# Patient Record
Sex: MALE | Race: WHITE | Employment: FULL TIME | URBAN - METROPOLITAN AREA
[De-identification: names, ages, dates, MRNs, and addresses within clinical notes are randomized per-mention and may not be internally consistent; named-entity substitution may affect disease eponyms.]

---

## 2017-11-07 ENCOUNTER — APPOINTMENT (OUTPATIENT)
Dept: GENERAL RADIOLOGY | Age: 37
End: 2017-11-07
Attending: PHYSICIAN ASSISTANT
Payer: SELF-PAY

## 2017-11-07 ENCOUNTER — HOSPITAL ENCOUNTER (EMERGENCY)
Age: 37
Discharge: HOME | End: 2017-11-07
Attending: EMERGENCY MEDICINE
Payer: SELF-PAY

## 2017-11-07 VITALS
OXYGEN SATURATION: 100 % | SYSTOLIC BLOOD PRESSURE: 135 MMHG | TEMPERATURE: 97.6 F | HEART RATE: 81 BPM | WEIGHT: 235 LBS | HEIGHT: 74 IN | DIASTOLIC BLOOD PRESSURE: 73 MMHG | RESPIRATION RATE: 17 BRPM | BODY MASS INDEX: 30.16 KG/M2

## 2017-11-07 DIAGNOSIS — R07.9 CHEST PAIN, UNSPECIFIED TYPE: Primary | ICD-10-CM

## 2017-11-07 LAB
BNP SERPL-MCNC: 25 PG/ML (ref 0–450)
CK MB CFR SERPL CALC: ABNORMAL % (ref 0–4)
CK MB SERPL-MCNC: <1 NG/ML (ref 5–25)
CK SERPL-CCNC: 384 U/L (ref 39–308)
D DIMER PPP FEU-MCNC: <0.27 UG/ML(FEU)
TROPONIN I SERPL-MCNC: <0.02 NG/ML (ref 0–0.06)

## 2017-11-07 PROCEDURE — 83880 ASSAY OF NATRIURETIC PEPTIDE: CPT

## 2017-11-07 PROCEDURE — 85379 FIBRIN DEGRADATION QUANT: CPT

## 2017-11-07 PROCEDURE — 74011250636 HC RX REV CODE- 250/636: Performed by: PHYSICIAN ASSISTANT

## 2017-11-07 PROCEDURE — 82550 ASSAY OF CK (CPK): CPT

## 2017-11-07 PROCEDURE — 93005 ELECTROCARDIOGRAM TRACING: CPT

## 2017-11-07 PROCEDURE — 96374 THER/PROPH/DIAG INJ IV PUSH: CPT

## 2017-11-07 PROCEDURE — 71010 XR CHEST PORT: CPT

## 2017-11-07 PROCEDURE — 99283 EMERGENCY DEPT VISIT LOW MDM: CPT

## 2017-11-07 RX ORDER — KETOROLAC TROMETHAMINE 30 MG/ML
30 INJECTION, SOLUTION INTRAMUSCULAR; INTRAVENOUS
Status: COMPLETED | OUTPATIENT
Start: 2017-11-07 | End: 2017-11-07

## 2017-11-07 RX ADMIN — KETOROLAC TROMETHAMINE 30 MG: 30 INJECTION, SOLUTION INTRAMUSCULAR at 18:12

## 2017-11-07 NOTE — ED NOTES
I have reviewed discharge instructions with the patient. The patient verbalized understanding. Patient armband removed and given to patient to take home. Patient was informed of the privacy risks if armband lost or stolen  There are no discharge medications for this patient.

## 2017-11-07 NOTE — DISCHARGE INSTRUCTIONS
Chest Pain: Care Instructions  Your Care Instructions    There are many things that can cause chest pain. Some are not serious and will get better on their own in a few days. But some kinds of chest pain need more testing and treatment. Your doctor may have recommended a follow-up visit in the next 8 to 12 hours. If you are not getting better, you may need more tests or treatment. Even though your doctor has released you, you still need to watch for any problems. The doctor carefully checked you, but sometimes problems can develop later. If you have new symptoms or if your symptoms do not get better, get medical care right away. If you have worse or different chest pain or pressure that lasts more than 5 minutes or you passed out (lost consciousness), call 911 or seek other emergency help right away. A medical visit is only one step in your treatment. Even if you feel better, you still need to do what your doctor recommends, such as going to all suggested follow-up appointments and taking medicines exactly as directed. This will help you recover and help prevent future problems. How can you care for yourself at home? · Rest until you feel better. · Take your medicine exactly as prescribed. Call your doctor if you think you are having a problem with your medicine. · Do not drive after taking a prescription pain medicine. When should you call for help? Call 911 if:  ? · You passed out (lost consciousness). ? · You have severe difficulty breathing. ? · You have symptoms of a heart attack. These may include:  ¨ Chest pain or pressure, or a strange feeling in your chest.  ¨ Sweating. ¨ Shortness of breath. ¨ Nausea or vomiting. ¨ Pain, pressure, or a strange feeling in your back, neck, jaw, or upper belly or in one or both shoulders or arms. ¨ Lightheadedness or sudden weakness. ¨ A fast or irregular heartbeat.   After you call 911, the  may tell you to chew 1 adult-strength or 2 to 4 low-dose aspirin. Wait for an ambulance. Do not try to drive yourself. ?Call your doctor today if:  ? · You have any trouble breathing. ? · Your chest pain gets worse. ? · You are dizzy or lightheaded, or you feel like you may faint. ? · You are not getting better as expected. ? · You are having new or different chest pain. Where can you learn more? Go to http://tuan-richie.info/. Enter A120 in the search box to learn more about \"Chest Pain: Care Instructions. \"  Current as of: 2017  Content Version: 11.4  © 4187-2273 [employer]. Care instructions adapted under license by [employer] (which disclaims liability or warranty for this information). If you have questions about a medical condition or this instruction, always ask your healthcare professional. [address] any warranty or liability for your use of this information. ConnectEdu Activation    Thank you for requesting access to ConnectEdu. Please follow the instructions below to securely access and download your online medical record. ConnectEdu allows you to send messages to your doctor, view your test results, renew your prescriptions, schedule appointments, and more. How Do I Sign Up? 1. In your internet browser, go to [email or web address]  2. Click on the First Time User? Click Here link in the Sign In box. You will be redirect to the New Member Sign Up page. 3. Enter your ConnectEdu Access Code exactly as it appears below. You will not need to use this code after youve completed the sign-up process. If you do not sign up before the expiration date, you must request a new code. ConnectEdu Access Code: [email or web address]  Expires: 2018  6:11 PM (This is the date your ConnectEdu access code will )    4. Enter the last four digits of your Social Security Number (xxxx) and Date of Birth (mm/dd/yyyy) as indicated and click Submit.  You will be taken to the next sign-up page.  5. Create a On [employer] Bill[employer] ID. This will be your [employer] login ID and cannot be changed, so think of one that is secure and easy to remember. 6. Create a [employer] password. You can change your password at any time. 7. Enter your Password Reset Question and Answer. This can be used at a later time if you forget your password. 8. Enter your e-mail address. You will receive e-mail notification when new information is available in [address]. 9. Click Sign Up. You can now view and download portions of your medical record. 10. Click the Download Summary menu link to download a portable copy of your medical information. Additional Information    If you have questions, please visit the Frequently Asked Questions section of the [employer] website at [email or web address] Remember, [employer] is NOT to be used for urgent needs. For medical emergencies, dial 911.

## 2017-11-07 NOTE — LETTER
[address] [place] [address] Dr [facility] EMERGENC[facility] DEPT 
[address] [facility] [address] [phone] Work/School Note Date: 11/7/2017 To Whom It May concern: 
 
Devon Malin was seen and treated today in the emergency room by the following provider(s): 
Attending Provider: Boubacar Smallwood MD 
Physician Assistant: EMEKA Ferro. Devon Malin may return to work on Nov 9 th, 2017. Sincerely, EMEKA Ferro

## 2017-11-07 NOTE — ED PROVIDER NOTES
Patient is a 44 y.o. male presenting with chest pain. Chest Pain (Angina)       Alba Soto is a 44 y.o. male resident of [place] recently moved to [place] presents with c/o L sided chest pain started at rest 2 days ago and described as sharp constant, waxing and waning and currently 7/10 but denies of any SOB, fever, cough,chills, n/v, diaphoresis or palpitation. No hx of lower ext swelling orthopnea, PND or diff breathing or known hx of blood clot. Past medical hx included VSD post repair when he was 7 months old. Had cardiologist in [place] and does not have physician here. Has has similar pain in the same site multiple times in the past and all work up has been negative. Last episode was 1 yrs ago \"and every time I am under stress I get these chest pain and now I am under some family related stress\"   Has a > 20 pkk/yr smoking hx. Social History     Social History    Marital status: SINGLE     Spouse name: N/A    Number of children: N/A    Years of education: N/A     Occupational History    Not on file. Social History Main Topics    Smoking status: Not on file    Smokeless tobacco: Not on file    Alcohol use Not on file    Drug use: Not on file    Sexual activity: Not on file     Other Topics Concern    Not on file     Social History Narrative         ALLERGIES: Review of patient's allergies indicates no known allergies. Review of Systems   Constitutional: Negative. HENT: Negative. Eyes: Negative. Respiratory: Negative. Cardiovascular: Positive for chest pain. Gastrointestinal: Negative. Endocrine: Negative. Genitourinary: Negative. Musculoskeletal: Negative. Skin: Negative. Allergic/Immunologic: Negative. Neurological: Negative. Hematological: Negative. Psychiatric/Behavioral: Negative.         Vitals:    11/07/17 1611 11/07/17 1700   BP: 153/86 153/86   Pulse: 75 74   Resp: 20 22   Temp: 97.6 °F (36.4 °C)    SpO2: 99% 96%   Weight: 106.6 kg (235 lb)    Height: 6' 2\" (1.88 m)             Physical Exam   Constitutional: He is oriented to person, place, and time. He appears well-developed and well-nourished. No distress. HENT:   Head: Normocephalic and atraumatic. Eyes: Conjunctivae and EOM are normal. Pupils are equal, round, and reactive to light. Neck: Normal range of motion. Cardiovascular: Normal rate, regular rhythm and normal heart sounds. Pulmonary/Chest: Effort normal and breath sounds normal. No respiratory distress. He has no wheezes. He has no rales. He exhibits tenderness. Abdominal: Soft. Bowel sounds are normal. He exhibits no distension. There is no tenderness. There is no rebound and no guarding. Musculoskeletal: Normal range of motion. Neurological: He is alert and oriented to person, place, and time. Skin: Skin is warm. He is not diaphoretic. Psychiatric: He has a normal mood and affect. His behavior is normal. Judgment and thought content normal.        MDM  Number of Diagnoses or Management Options  Chest pain, unspecified type:   Diagnosis management comments: With hx of VSD and tobacco use will get cardiac enzymes including D Dimer If negative will discharge recommend follow up with cardiologist as OP. Patient updated on labs and recommended on smoking cessation and to get cardiology care as well. All question answered.  Will discharge    ED Course       Procedures    Recent Results (from the past 12 hour(s))   EKG, 12 LEAD, INITIAL    Collection Time: 11/07/17  4:09 PM   Result Value Ref Range    Ventricular Rate 85 BPM    Atrial Rate 85 BPM    P-R Interval 146 ms    QRS Duration 86 ms    Q-T Interval 378 ms    QTC Calculation (Bezet) 449 ms    Calculated P Axis 45 degrees    Calculated R Axis 40 degrees    Calculated T Axis 47 degrees    Diagnosis       Normal sinus rhythm  Normal ECG  No previous ECGs available     CARDIAC PANEL,(CK, CKMB & TROPONIN)    Collection Time: 11/07/17  4:40 PM Result Value Ref Range     (H) 39 - 308 U/L    CK - MB <1.0 <3.6 ng/ml    CK-MB Index  0.0 - 4.0 %     CALCULATION NOT PERFORMED WHEN RESULT IS BELOW LINEAR LIMIT    Troponin-I, Qt. <0.02 0.00 - 0.06 NG/ML   NT-PRO BNP    Collection Time: 11/07/17  4:40 PM   Result Value Ref Range    NT pro-BNP 25 0 - 450 PG/ML   D DIMER    Collection Time: 11/07/17  4:40 PM   Result Value Ref Range    D DIMER <0.27 <0.46 ug/ml(FEU)         DISCHARGE NOTE:  6:12 PM    Juan Stephenson  results have been reviewed with him. He has been counseled regarding his diagnosis, treatment, and plan. He verbally conveys understanding and agreement of the signs, symptoms, diagnosis, treatment and prognosis and additionally agrees to follow up as discussed. He also agrees with the care-plan and conveys that all of his questions have been answered. I have also provided discharge instructions for him that include: educational information regarding their diagnosis and treatment, and list of reasons why they would want to return to the ED prior to their follow-up appointment, should his condition change. CLINICAL IMPRESSION:    1. Chest pain, unspecified type        AFTER VISIT PLAN:    There are no discharge medications for this patient.        Follow-up Information     Follow up With Details Comments Jes Fay  To establish care with primary care provider [address] [address] [phone]    [facility] EMERGENCY DEPT  If symptoms worsen [address] Celina Hernandez MD Schedule an appointment as soon as possible for a visit cardiology [facility]  [facility]  [place] [address]  [phone]             Written by Deepak Santiago PA-C

## 2017-11-07 NOTE — LETTER
[address] [place] [address] [facility] EMERG[facility] 
[address] [facility] [address] 
[phone] Work/School Note Date: 11/7/2017 To Whom It May concern: 
 
Kaylynn Wilkerson was seen and treated today in the emergency room by the following provider(s): 
Attending Provider: Vero Dubose MD 
Physician Assistant: EMEKA Sanchez. Kaylynn Wilkerson may return to work on Nov 8 th, 2017. Stephan Randolph Sincerely, EMEKA Sanchez

## 2017-11-08 LAB
ATRIAL RATE: 85 BPM
CALCULATED P AXIS, ECG09: 45 DEGREES
CALCULATED R AXIS, ECG10: 40 DEGREES
CALCULATED T AXIS, ECG11: 47 DEGREES
DIAGNOSIS, 93000: NORMAL
P-R INTERVAL, ECG05: 146 MS
Q-T INTERVAL, ECG07: 378 MS
QRS DURATION, ECG06: 86 MS
QTC CALCULATION (BEZET), ECG08: 449 MS
VENTRICULAR RATE, ECG03: 85 BPM

## 2018-11-07 ENCOUNTER — HOSPITAL ENCOUNTER (EMERGENCY)
Age: 38
Discharge: HOME | End: 2018-11-08
Attending: EMERGENCY MEDICINE
Payer: SELF-PAY

## 2018-11-07 VITALS
RESPIRATION RATE: 16 BRPM | WEIGHT: 227 LBS | OXYGEN SATURATION: 95 % | HEART RATE: 79 BPM | BODY MASS INDEX: 28.23 KG/M2 | TEMPERATURE: 98.6 F | DIASTOLIC BLOOD PRESSURE: 83 MMHG | SYSTOLIC BLOOD PRESSURE: 139 MMHG | HEIGHT: 75 IN

## 2018-11-07 DIAGNOSIS — H65.02 ACUTE SEROUS OTITIS MEDIA OF LEFT EAR, RECURRENCE NOT SPECIFIED: Primary | ICD-10-CM

## 2018-11-07 PROCEDURE — 99283 EMERGENCY DEPT VISIT LOW MDM: CPT

## 2018-11-08 PROCEDURE — 74011250637 HC RX REV CODE- 250/637: Performed by: EMERGENCY MEDICINE

## 2018-11-08 RX ORDER — AMOXICILLIN 250 MG/1
500 CAPSULE ORAL
Status: COMPLETED | OUTPATIENT
Start: 2018-11-08 | End: 2018-11-08

## 2018-11-08 RX ORDER — AMOXICILLIN 500 MG/1
500 TABLET, FILM COATED ORAL 3 TIMES DAILY
Qty: 30 TAB | Refills: 0 | Status: SHIPPED | OUTPATIENT
Start: 2018-11-08

## 2018-11-08 RX ORDER — IBUPROFEN 600 MG/1
600 TABLET ORAL
Qty: 20 TAB | Refills: 0 | Status: SHIPPED | OUTPATIENT
Start: 2018-11-08

## 2018-11-08 RX ORDER — IBUPROFEN 600 MG/1
600 TABLET ORAL
Status: COMPLETED | OUTPATIENT
Start: 2018-11-08 | End: 2018-11-08

## 2018-11-08 RX ADMIN — IBUPROFEN 600 MG: 600 TABLET ORAL at 00:15

## 2018-11-08 RX ADMIN — AMOXICILLIN 500 MG: 250 CAPSULE ORAL at 00:15

## 2018-11-08 NOTE — DISCHARGE INSTRUCTIONS
Middle Ear Fluid: Care Instructions  Your Care Instructions    Fluid often builds up inside the ear during a cold or allergies. Usually the fluid drains away, but sometimes a small tube in the ear, called the eustachian tube, stays blocked for months. Symptoms of fluid buildup may include:  · Popping, ringing, or a feeling of fullness or pressure in the ear. · Trouble hearing. · Balance problems and dizziness. In most cases, you can treat yourself at home. Follow-up care is a key part of your treatment and safety. Be sure to make and go to all appointments, and call your doctor if you are having problems. It's also a good idea to know your test results and keep a list of the medicines you take. How can you care for yourself at home? · In most cases, the fluid clears up within a few months without treatment. You may need more tests if the fluid does not clear up after 3 months. · If your doctor prescribed antibiotics, take them as directed. Do not stop taking them just because you feel better. You need to take the full course of antibiotics. When should you call for help? Call your doctor now or seek immediate medical care if:    · You have symptoms of infection, such as:  ? Increased pain, swelling, warmth, or redness. ? Pus draining from the area. ? A fever.    Watch closely for changes in your health, and be sure to contact your doctor if:    · You notice changes in hearing.     · You do not get better as expected. Where can you learn more? Go to http://tuan-richie.info/. Enter O771 in the search box to learn more about \"Middle Ear Fluid: Care Instructions. \"  Current as of: March 28, 2018  Content Version: 11.8  © 0466-9484 [employer]. Care instructions adapted under license by [employer] (which disclaims liability or warranty for this information).  If you have questions about a medical condition or this instruction, always ask your healthcare professional. [address] any warranty or liability for your use of this information.

## 2018-11-08 NOTE — ED PROVIDER NOTES
EMERGENCY DEPARTMENT HISTORY AND PHYSICAL EXAM 
 
12:03 AM 
 
 
Date: 11/7/2018 Patient Name: Chelsea Reyes History of Presenting Illness Chief Complaint Patient presents with  Ear Pain History Provided By: Patient Chief Complaint: Ear Pain Duration:  1-2 days Timing:  Acute Location: Left Ear Quality: Aching Severity: Mild Modifying Factors: None. Associated Symptoms: denies any other associated signs or symptoms Additional History (Context): Chelsea Reyes is a 43 y.o. male with No significant past medical history who presents with c/o mild, acute left ear pain onset 1-2 days. Pt states he gets ear infections fairly regularly and that current sx are consistent with previous infections. Denies any fever, chills, CP, SOB, abdominal pain, nausea, vomiting, diarrhea, urinary sx and any associated signs or sx. No further concerns or complaints. PCP: [place], MD Radha 
 
 
 
Past History Past Medical History: 
History reviewed. No pertinent past medical history. Past Surgical History: 
History reviewed. No pertinent surgical history. Family History: 
History reviewed. No pertinent family history. Social History: 
Social History Tobacco Use  Smoking status: Not on file Substance Use Topics  Alcohol use: Not on file  Drug use: Not on file Allergies: 
No Known Allergies Review of Systems Review of Systems Constitutional: Negative for activity change, fatigue and fever. HENT: Positive for ear pain. Negative for congestion and rhinorrhea. Eyes: Negative for visual disturbance. Respiratory: Negative for shortness of breath. Cardiovascular: Negative for chest pain and palpitations. Gastrointestinal: Negative for abdominal pain, diarrhea, nausea and vomiting. Genitourinary: Negative for dysuria and hematuria. Musculoskeletal: Negative for back pain. Skin: Negative for rash. Neurological: Negative for dizziness, weakness and light-headedness. All other systems reviewed and are negative. Physical Exam  
 
Visit Vitals /83 Pulse 79 Temp 98.6 °F (37 °C) Resp 16 Ht 6' 3\" (1.905 m) Wt 103 kg (227 lb) SpO2 95% BMI 28.37 kg/m² Physical Exam  
Constitutional: He is oriented to person, place, and time. He appears well-developed and well-nourished. No distress. HENT:  
Head: Normocephalic. Mouth/Throat: Oropharynx is clear and moist.  
Left ear erythematous, dull TM Eyes: Conjunctivae and EOM are normal. Pupils are equal, round, and reactive to light. Neck: Normal range of motion. Neck supple. Cardiovascular: Normal rate, regular rhythm, normal heart sounds and intact distal pulses. No murmur heard. Pulmonary/Chest: Effort normal and breath sounds normal. No respiratory distress. He has no wheezes. He has no rales. He exhibits no tenderness. Abdominal: Soft. Bowel sounds are normal. He exhibits no distension. There is no tenderness. There is no rebound. Musculoskeletal: Normal range of motion. He exhibits no edema or tenderness. Neurological: He is alert and oriented to person, place, and time. No cranial nerve deficit. He exhibits normal muscle tone. Coordination normal.  
Skin: Skin is warm and dry. No rash noted. Psychiatric: He has a normal mood and affect. His behavior is normal. Judgment and thought content normal.  
Nursing note and vitals reviewed. Diagnostic Study Results Medical Decision Making I am the first provider for this patient. I reviewed the vital signs, available nursing notes, past medical history, past surgical history, family history and social history. Vital Signs-Reviewed the patient's vital signs. Records Reviewed: Nursing Notes (Time of Review: 12:03 AM) ED Course: Progress Notes, Reevaluation, and Consults: 
 
Provider Notes (Medical Decision Making): otitis media, otitis external, URI, pneumonia Diagnosis Clinical Impression: 1. Acute serous otitis media of left ear, recurrence not specified Disposition: Home. Follow-up Information None Medication List  
  
You have not been prescribed any medications. _______________________________ Scribe Attestation Samanta Collado acting as a scribe for and in the presence of Jeannine Suarez MD     
November 08, 2018 at 12:03 AM 
    
Provider Attestation:     
I personally performed the services described in the documentation, reviewed the documentation, as recorded by the scribe in my presence, and it accurately and completely records my words and actions. November 08, 2018 at 12:03 AM - Jeannine Suarez MD   
 
 
_______________________________

## 2018-11-08 NOTE — ED NOTES
Pt discharge home in stable condition with verbal and written instructions about medications and follow up.

## 2018-12-27 ENCOUNTER — HOSPITAL ENCOUNTER (EMERGENCY)
Age: 38
Discharge: HOME | End: 2018-12-27
Attending: EMERGENCY MEDICINE
Payer: SELF-PAY

## 2018-12-27 VITALS
WEIGHT: 230 LBS | HEIGHT: 75 IN | DIASTOLIC BLOOD PRESSURE: 76 MMHG | OXYGEN SATURATION: 97 % | BODY MASS INDEX: 28.6 KG/M2 | HEART RATE: 88 BPM | RESPIRATION RATE: 16 BRPM | TEMPERATURE: 99 F | SYSTOLIC BLOOD PRESSURE: 115 MMHG

## 2018-12-27 DIAGNOSIS — H92.02 LEFT EAR PAIN: Primary | ICD-10-CM

## 2018-12-27 DIAGNOSIS — Z71.1 CONCERN ABOUT STD IN MALE WITHOUT DIAGNOSIS: ICD-10-CM

## 2018-12-27 LAB
APPEARANCE UR: CLEAR
BILIRUB UR QL: NEGATIVE
COLOR UR: YELLOW
EPITH CASTS URNS QL MICRO: ABNORMAL /LPF (ref 0–5)
GLUCOSE UR STRIP.AUTO-MCNC: NEGATIVE MG/DL
HGB UR QL STRIP: NEGATIVE
KETONES UR QL STRIP.AUTO: NEGATIVE MG/DL
LEUKOCYTE ESTERASE UR QL STRIP.AUTO: ABNORMAL
MUCOUS THREADS URNS QL MICRO: ABNORMAL /LPF
NITRITE UR QL STRIP.AUTO: NEGATIVE
PH UR STRIP: 6.5 [PH] (ref 5–8)
PROT UR STRIP-MCNC: NEGATIVE MG/DL
RBC #/AREA URNS HPF: ABNORMAL /HPF (ref 0–5)
SP GR UR REFRACTOMETRY: 1.03 (ref 1–1.03)
UROBILINOGEN UR QL STRIP.AUTO: 1 EU/DL (ref 0.2–1)
WBC URNS QL MICRO: ABNORMAL /HPF (ref 0–4)

## 2018-12-27 PROCEDURE — 99283 EMERGENCY DEPT VISIT LOW MDM: CPT

## 2018-12-27 PROCEDURE — 96372 THER/PROPH/DIAG INJ SC/IM: CPT

## 2018-12-27 PROCEDURE — 74011250637 HC RX REV CODE- 250/637: Performed by: PHYSICIAN ASSISTANT

## 2018-12-27 PROCEDURE — 87491 CHLMYD TRACH DNA AMP PROBE: CPT

## 2018-12-27 PROCEDURE — 81001 URINALYSIS AUTO W/SCOPE: CPT

## 2018-12-27 PROCEDURE — 74011250636 HC RX REV CODE- 250/636: Performed by: PHYSICIAN ASSISTANT

## 2018-12-27 RX ORDER — AZITHROMYCIN 250 MG/1
1000 TABLET, FILM COATED ORAL
Status: COMPLETED | OUTPATIENT
Start: 2018-12-27 | End: 2018-12-27

## 2018-12-27 RX ADMIN — AZITHROMYCIN 1000 MG: 250 TABLET, FILM COATED ORAL at 11:16

## 2018-12-27 RX ADMIN — LIDOCAINE HYDROCHLORIDE 250 MG: 10 INJECTION, SOLUTION EPIDURAL; INFILTRATION; INTRACAUDAL; PERINEURAL at 11:16

## 2018-12-27 NOTE — ED NOTES
I have reviewed discharge instructions with the patient. The patient verbalized understanding. Pt  dc'd via ambulation with belongings. Pt left before receiving  Written instructions.  Pt given verbal instructions

## 2018-12-27 NOTE — ED PROVIDER NOTES
EMERGENCY DEPARTMENT HISTORY AND PHYSICAL EXAM    Date: 12/27/2018  Patient Name: Charan Short    History of Presenting Illness     Chief Complaint   Patient presents with    Ear Pain         History Provided By: Patient    Chief Complaint: ear pain  Duration: 1 week  Timing:  Constant  Location: left ear  Quality: Aching  Severity: 3 out of 10  Modifying Factors: \"I am obsessed about cleaning my ears, I think I did something. \"  Associated Symptoms: ears pop      Additional History (Context): Charan Short is a 43 y.o. male with No significant past medical history who presents with left ear pain x 1 week. Pt states he is \"obsessed\" with cleaning his ears since age of 4 and is concerned he may have \"damaged something. \" States ear has been popping and feeling full. Denies fever, chills, ST, cough or any other associated complaints. Has hx of multiple infections. He is also concerned about possible STD exposure. States his significant other was treated for \"UTI and was negative for the other things but I think I still have something. \" Denies penile discharge, abd pain, dysuria or any other complaints or symptoms. PCP: Radha Rose MD    Current Outpatient Medications   Medication Sig Dispense Refill    ibuprofen (MOTRIN) 600 mg tablet Take 1 Tab by mouth every six (6) hours as needed for Pain. 20 Tab 0    amoxicillin 500 mg tab Take 500 mg by mouth three (3) times daily. 30 Tab 0       Past History     Past Medical History:  No past medical history on file. Past Surgical History:  No past surgical history on file. Family History:  No family history on file. Social History:  Social History     Tobacco Use    Smoking status: Not on file   Substance Use Topics    Alcohol use: Not on file    Drug use: Not on file       Allergies:  No Known Allergies      Review of Systems   Review of Systems   HENT: Positive for ear pain. Negative for ear discharge. Genitourinary: Negative.     All other systems reviewed and are negative. All Other Systems Negative  Physical Exam     Vitals:    12/27/18 1031   BP: 115/76   Pulse: 88   Resp: 16   Temp: 99 °F (37.2 °C)   SpO2: 97%   Weight: 104.3 kg (230 lb)   Height: 6' 3\" (1.905 m)     Physical Exam   Constitutional: He appears well-developed and well-nourished. No distress. HENT:   Head: Normocephalic and atraumatic. Right Ear: Hearing, tympanic membrane, external ear and ear canal normal.   Left Ear: Hearing, external ear and ear canal normal. Tympanic membrane is not perforated and not retracted. Nose: Nose normal.   Mouth/Throat: Oropharynx is clear and moist.   Left TM slightly red and bulging with effusion    Eyes: Conjunctivae are normal. Right eye exhibits no discharge. Left eye exhibits no discharge. Neck: Normal range of motion. Neck supple. Cardiovascular: Normal rate and regular rhythm. Genitourinary:   Genitourinary Comments:  exam deferred due to no symptoms   Lymphadenopathy:     He has no cervical adenopathy. Neurological: He is alert. Skin: Skin is warm and dry. He is not diaphoretic. Psychiatric: He has a normal mood and affect.             Diagnostic Study Results     Labs -     Recent Results (from the past 12 hour(s))   URINALYSIS W/ RFLX MICROSCOPIC    Collection Time: 12/27/18 10:36 AM   Result Value Ref Range    Color YELLOW      Appearance CLEAR      Specific gravity 1.027 1.005 - 1.030      pH (UA) 6.5 5.0 - 8.0      Protein NEGATIVE  NEG mg/dL    Glucose NEGATIVE  NEG mg/dL    Ketone NEGATIVE  NEG mg/dL    Bilirubin NEGATIVE  NEG      Blood NEGATIVE  NEG      Urobilinogen 1.0 0.2 - 1.0 EU/dL    Nitrites NEGATIVE  NEG      Leukocyte Esterase TRACE (A) NEG     URINE MICROSCOPIC ONLY    Collection Time: 12/27/18 10:36 AM   Result Value Ref Range    WBC 0 to 3 0 - 4 /hpf    RBC NONE 0 - 5 /hpf    Epithelial cells FEW 0 - 5 /lpf    Mucus 1+ (A) NEG /lpf       Radiologic Studies -   No orders to display     CT Results  (Last 48 hours)    None        CXR Results  (Last 48 hours)    None            Medical Decision Making   I am the first provider for this patient. I reviewed the vital signs, available nursing notes, past medical history, past surgical history, family history and social history. Vital Signs-Reviewed the patient's vital signs. Pulse Oximetry Analysis - 97% on RA      Records Reviewed: Nursing Notes    Procedures:  Procedures    Provider Notes (Medical Decision Making): pt c/o left ear pain and concern he may have damaged something while cleaning ear. No evidence of infection and pt declines abx, will get otc zyrtec prn. Concerned about std w/o symptoms. Will empirically treat for gonorrhea and chlamydia. MED RECONCILIATION:  No current facility-administered medications for this encounter. Current Outpatient Medications   Medication Sig    ibuprofen (MOTRIN) 600 mg tablet Take 1 Tab by mouth every six (6) hours as needed for Pain.  amoxicillin 500 mg tab Take 500 mg by mouth three (3) times daily. Disposition:  home    DISCHARGE NOTE:     Pt has been reexamined. Patient has no new complaints, changes, or physical findings. Care plan outlined and precautions discussed. Results of UA were reviewed with the patient. All medications were reviewed with the patient; will d/c home with otc zyrtec. All of pt's questions and concerns were addressed. Patient was instructed and agrees to follow up with ent, as well as to return to the ED upon further deterioration. Patient is ready to go home. Follow-up Information     Follow up With Specialties Details Why Contact Info    Juni Webster MD Otolaryngology, Surgery   [place] [address]  [address]  [address] [facility]  [phone]            Current Discharge Medication List          Diagnosis     Clinical Impression:   1. Left ear pain    2.  Concern about STD in male without diagnosis

## 2018-12-27 NOTE — DISCHARGE INSTRUCTIONS
Earache: Care Instructions  Your Care Instructions    Even though infection is a common cause of ear pain, not all ear pain means an infection. If you have ear pain and don't have an infection, it could be because of a jaw problem, such as temporomandibular joint (TMJ) pain. Or it could be because of a neck problem. When ear discomfort or pain is mild or comes and goes without other symptoms, home treatment may be all you need. Follow-up care is a key part of your treatment and safety. Be sure to make and go to all appointments, and call your doctor if you are having problems. It's also a good idea to know your test results and keep a list of the medicines you take. How can you care for yourself at home? · Apply heat on the ear to ease pain. To apply heat, put a warm water bottle, a heating pad set on low, or a warm cloth on your ear. Do not go to sleep with a heating pad on your skin. · Take an over-the-counter pain medicine, such as acetaminophen (Tylenol), ibuprofen (Advil, Motrin), or naproxen (Aleve). Be safe with medicines. Read and follow all instructions on the label. · Do not take two or more pain medicines at the same time unless the doctor told you to. Many pain medicines have acetaminophen, which is Tylenol. Too much acetaminophen (Tylenol) can be harmful. · Never insert anything, such as a cotton swab or a nargis pin, into the ear. When should you call for help? Call your doctor now or seek immediate medical care if:    · You have new or worse symptoms of infection, such as:  ? Increased pain, swelling, warmth, or redness. ? Red streaks leading from the area. ? Pus draining from the area. ? A fever.    Watch closely for changes in your health, and be sure to contact your doctor if:    · You have new or worse discharge coming from the ear.     · You do not get better as expected. Where can you learn more? Go to http://tuan-richie.info/.   Enter Y808 in the search box to learn more about \"Earache: Care Instructions. \"  Current as of: March 28, 2018  Content Version: 11.8  © 8963-0921 [employer], [employer]. Care instructions adapted under license by [employer] (which disclaims liability or warranty for this information). If you have questions about a medical condition or this instruction, always ask your healthcare professional. [address] any warranty or liability for your use of this information.

## 2018-12-28 LAB
C TRACH RRNA SPEC QL NAA+PROBE: POSITIVE
N GONORRHOEA RRNA SPEC QL NAA+PROBE: NEGATIVE
SPECIMEN SOURCE: ABNORMAL